# Patient Record
Sex: FEMALE | Race: WHITE | URBAN - METROPOLITAN AREA
[De-identification: names, ages, dates, MRNs, and addresses within clinical notes are randomized per-mention and may not be internally consistent; named-entity substitution may affect disease eponyms.]

---

## 2020-03-13 ENCOUNTER — EMERGENCY (EMERGENCY)
Facility: HOSPITAL | Age: 35
LOS: 1 days | Discharge: HOME | End: 2020-03-13
Attending: EMERGENCY MEDICINE | Admitting: EMERGENCY MEDICINE
Payer: SELF-PAY

## 2020-03-13 VITALS
HEART RATE: 68 BPM | OXYGEN SATURATION: 100 % | SYSTOLIC BLOOD PRESSURE: 109 MMHG | DIASTOLIC BLOOD PRESSURE: 73 MMHG | RESPIRATION RATE: 18 BRPM | TEMPERATURE: 98 F

## 2020-03-13 VITALS
TEMPERATURE: 99 F | WEIGHT: 104.94 LBS | RESPIRATION RATE: 16 BRPM | HEART RATE: 75 BPM | DIASTOLIC BLOOD PRESSURE: 85 MMHG | SYSTOLIC BLOOD PRESSURE: 128 MMHG | OXYGEN SATURATION: 98 %

## 2020-03-13 DIAGNOSIS — R06.02 SHORTNESS OF BREATH: ICD-10-CM

## 2020-03-13 DIAGNOSIS — J20.9 ACUTE BRONCHITIS, UNSPECIFIED: ICD-10-CM

## 2020-03-13 PROCEDURE — 99284 EMERGENCY DEPT VISIT MOD MDM: CPT

## 2020-03-13 PROCEDURE — 93010 ELECTROCARDIOGRAM REPORT: CPT

## 2020-03-13 RX ORDER — ALBUTEROL 90 UG/1
2 AEROSOL, METERED ORAL
Qty: 1 | Refills: 0
Start: 2020-03-13 | End: 2020-03-22

## 2020-03-13 RX ORDER — AZITHROMYCIN 500 MG/1
1 TABLET, FILM COATED ORAL
Qty: 3 | Refills: 0
Start: 2020-03-13 | End: 2020-03-15

## 2020-03-13 RX ORDER — IPRATROPIUM/ALBUTEROL SULFATE 18-103MCG
3 AEROSOL WITH ADAPTER (GRAM) INHALATION
Refills: 0 | Status: DISCONTINUED | OUTPATIENT
Start: 2020-03-13 | End: 2020-03-17

## 2020-03-13 RX ADMIN — Medication 3 MILLILITER(S): at 17:23

## 2020-03-13 RX ADMIN — Medication 60 MILLIGRAM(S): at 17:23

## 2020-03-13 NOTE — ED PROVIDER NOTE - PATIENT PORTAL LINK FT
You can access the FollowMyHealth Patient Portal offered by [facility] by registering at the following website: [email or web address]. By joining [facility]’s FollowMyHealth portal, you will also be able to view your health information using other applications (apps) compatible with our system.

## 2020-03-13 NOTE — ED PROVIDER NOTE - HISTORY ATTESTATION, MLM
I have reviewed and confirmed nurses' notes... coagulation(Bleeding disorder R/T clinical cond/anti-coags)/diagnosis/other

## 2020-03-13 NOTE — ED PROVIDER NOTE - CLINICAL SUMMARY MEDICAL DECISION MAKING FREE TEXT BOX
33 y/o F presenting with wheezing and SOB. Pt also reports having recent sore throat, subjective fevers, night sweats, and productive coughs within the last week. On exam, Pt appears well and in no apparent distress. Suspect symptoms are more likely related to bronchitis. Will check EKG and provide symptomatic relief with Duoneb and Prednisone. Anticipate D/C home afterwards with prescription medications.

## 2020-03-13 NOTE — ED ADULT NURSE NOTE - CHPI ED NUR SYMPTOMS NEG
no tingling/no decreased eating/drinking/no nausea/no pain/no chills/no vomiting/no weakness/no dizziness/no fever

## 2020-03-13 NOTE — ED ADULT NURSE NOTE - CAS EDP DISCH TYPE
HYPOGLYCEMIC EPISODE DOCUMENTATION Patient with hypoglycemic episode(s) at 0303(time) on 9/8/18(date). BG value(s) pre-treatment 54 Was patient symptomatic? [] yes, [x] no Patient was treated with the following rescue medications/treatments: [] D50 [] Glucose tablets 
              [] Glucagon 
              [x] 4oz juice 
              [] 6oz reg soda 
              [] 8oz low fat milk BG value post-treatment: 96 Once BG treated and value greater than 80mg/dl, pt was provided with the following: 
[] snack 
[] meal 
Name of MD notified:[facility] The following orders were received: do POC check and treat accordingly Home

## 2020-03-13 NOTE — ED PROVIDER NOTE - PROGRESS NOTE DETAILS
Pt reports her wheezing has gone down "quite a bit" following Nebulizer Tx. pt with significantly improved res sx, lungs CTA no wheezing, ambulatory without return of wheezing, stable for dc home

## 2020-03-13 NOTE — ED PROVIDER NOTE - ADDITIONAL NOTES AND INSTRUCTIONS:
PLEASE EXCUSE THIS PATIENT FROM WORK/SCHOOL AS NOTED FOR THE DATES ABOVE.  THIS PATIENT WAS SEEN AND EVALUATED IN THE EMERGENCY DEPARTMENT OF [facility].  tHIS PATIENT REQUIRES TIME OFF WORK/SCHOOL TO RECOVER FROM AN EMERGENCY MEDICAL CONDITION.  PLEASE EXCUSE THEIR ABSENCE.

## 2020-03-13 NOTE — ED ADULT NURSE NOTE - OBJECTIVE STATEMENT
35 yo F c/o SOB. Pt states she was at an international conference and returned on Monday with a sore throat, shortness of breath, and cough. Pt states "my chest feels tight and it feels like I am having trouble getting air in." Pt speaking in full complete sentences, lung sounds clear bilaterally, no signs of dyspnea and no cyanosis noted. Pt states "I coughed up some yellowish green stuff." Denies fever/chills, N/V/D, CP, headache, dizziness, numbness/tingling. Pt ambulatory with steady gait.

## 2020-03-13 NOTE — ED PROVIDER NOTE - NSFOLLOWUPINSTRUCTIONS_ED_ALL_ED_FT
What is bronchitis?  Bronchitis is an infection that causes a cough. It happens when the tubes that carry air into the lungs, called the "bronchi," get infected (figure 1).    Usually, bronchitis happens after a person gets a cold or the flu. The viruses that cause the cold or flu infect the bronchi and irritate them. People often wonder if taking antibiotics will help with their bronchitis. But the answer is no, because it is usually caused by a virus. Antibiotics kill bacteria, not viruses.    Bronchitis can also happen when a person gets an infection called "whooping cough," but this is much less common. Whooping cough is caused by bacteria that can infect the bronchi. Most people get vaccines that prevent whooping cough, but the vaccine doesn't always work. Your doctor will be able to tell if you have whooping cough by doing an exam and listening to way your cough sounds.    This article is about "acute" bronchitis. This is different from "chronic" bronchitis, which is an illness in smokers who have a long-lasting cough.      What are the symptoms of bronchitis?  The most common symptoms of bronchitis are:    ?A nagging cough that can last up to a few weeks      ?Coughing up mucus that is clear, yellow, or green – Some people think green mucus means you have a bacterial infection. But this is not always true.      ?You might also have normal cold or flu symptoms, like a stuffy nose, sore throat, or headache. People with bronchitis do not usually get a fever.        When should I call the doctor or nurse?  Most people who have a cough that lasts longer than their other cold or flu symptoms do not need to see a doctor. The cough can take up to 3 weeks to get better, sometimes even longer. But you should call your doctor or nurse if you have:    ?A fever higher than 100.4°F (38°C)    ?Chest pain when you cough, trouble breathing, or coughing up blood    ?A barking cough that makes it hard to talk    ?A cough and weight loss that you cannot explain    ?Symptoms that are not getting better after 3 weeks         Is there a test for bronchitis?  People do not usually need a test. But your doctor or nurse might do a test, such as a chest X-ray, if the cause of your cough isn't clear.      How is bronchitis treated?  Bronchitis almost always goes away on its own, although it can take a few weeks. Doctors do not usually treat bronchitis with antibiotic medicines. That's because bronchitis is usually caused by a virus, and antibiotics kill bacteria, not viruses. Antibiotics will not help your bronchitis go away faster, and they can actually cause other problems. So it's not a good idea to take them if you don't really need them.    To feel better, you can treat your cold and flu symptoms. Different treatments you can try include:    ?Getting lots of rest and drinking plenty of liquids    ?Drinking hot tea    ?Sucking on cough drops or hard candy    ?Taking over-the-counter cough and cold medicines    ?Breathing in warm, moist air, such as in the shower, over a kettle, or from a humidifier    ?Taking a pain-relieving medicine if you have cold or flu symptoms like headache, muscle aches, or joint pain      It's also important to avoid smoking or being around others who smoke. This can make your cough worse.      How can I keep from getting bronchitis again?  You can reduce your chance of getting bronchitis again by keeping the germs that cause bronchitis out of your body. One of the best ways to do this is to wash your hands often with soap and water. If there is no sink nearby, you can use a hand gel with alcohol in it to clean your hands.      How can I keep from spreading my germs?  In addition to washing your hands often, you should cover your mouth with your elbow when you sneeze or cough. Using your elbow keeps you from getting germs on your hands. If you use a tissue, throw the tissue away and wash your hands.

## 2020-07-21 NOTE — ED PROVIDER NOTE - PMH
Chief Complaint   Patient presents with    Follow-up     6 month follow up     Health Maintenance   Topic Date Due    Hepatitis C Screening  1953    DTaP,Tdap,and Td Vaccines (1 - Tdap) 09/25/1965    HIV Screening  09/25/1969    MAMMOGRAM  08/07/2019    Pneumococcal Vaccine: 65+ Years (1 of 2 - PCV13) 09/25/2019    Influenza Vaccine  07/01/2020    BMI: Followup Plan  01/20/2021    Fall Risk  01/20/2021    Annual Physical  02/11/2021    BMI: Adult  07/21/2021    CRC Screening: Colonoscopy  03/30/2026    Pneumococcal Vaccine: Pediatrics (0 to 5 Years) and At-Risk Patients (6 to 59 Years)  Aged Out    HIB Vaccine  Aged Out    Hepatitis B Vaccine  Aged Out    IPV Vaccine  Aged Out    Hepatitis A Vaccine  Aged Out    Meningococcal ACWY Vaccine  Aged Out    HPV Vaccine  Aged Out     Assessment/Plan:    HTN (hypertension)  BP is stable  Continue Losartan 100 mg daily, Metoprolol ER 25 mg twice daily  Mixed hyperlipidemia  Continue Atorvastatin 40 mg daily  Check  lipid panel  Vitamin D deficiency  Patient has not been taking Vit D 50,000 IU weekly on a regular basis  Will check Vit D 25 hydroxy and instruct patient regarding dose she needs to take  Hypothyroidism  Continue replacement with Levothyroxine  Check TSH level  Depression with anxiety  Mood has been stable  Continue Citalopram 40 mg daily, Lamictal 200 mg daily, Klonopin 2 mg at bedtime  Left hip pain  Will order x-ray of the left hip to rule out osteoarthritis  Patient c/o some burning sensation over anterior and lateral thigh, r/o   meralgia paresthetica  Consider starting on Gabapentin, referral to orthopedic surgeon  Recommended to try OTC Lidocaine patches  HM: recommended to schedule screening mammogram as ordered in 1/2020  Colonoscopy done in 3/2016  Patient had episode of acute diverticulitis in June 2020  Recommended to schedule follow-up visit with colorectal surgeon Dr Leanne Shannon  Schedule follow-up office visit in 6 months  Diagnoses and all orders for this visit:    Essential hypertension    Mixed hyperlipidemia  -     Lipid panel; Future  -     Lipid panel    Vitamin D deficiency  -     Vitamin D 25 hydroxy; Future  -     Vitamin D 25 hydroxy  -     ergocalciferol (VITAMIN D2) 50,000 units; Take 1 capsule (50,000 Units total) by mouth once a week    Acquired hypothyroidism  -     TSH, 3rd generation with Free T4 reflex; Future  -     TSH, 3rd generation with Free T4 reflex    Depression with anxiety    Need for hepatitis C screening test  -     Hepatitis C antibody; Future    Left hip pain  -     XR hip/pelv 2-3 vws left if performed; Future          Subjective:      Patient ID: Go Rashid is a 73 y o  female  HPI     Patient presents for 6 month follow-up visit  PMHx: HTN, Hyperlipidemia, Obesity,  Hypothyroidism, Depression, Anxiety, Vit D deficiency  Reviewed current medications with patient  Patient was seen in [facility] emergency room in June 2020 for acute diverticulitis  She was treated with Augmentin  Denies abdominal pain, diarrhea, constipation  She had colonoscopy done in March 2016 by colorectal surgeon Dr Ayan Brsyon  Colonoscopy showed severe diverticulosis, 1 polyp was removed  HTN - BP emains stable  Patient takes Losartan 100 mg daily, Metoprolol ER 25 mg twice daily  Denies chest pain, shortness of breath, dizziness  Blood test done in June 2020 showed creatinine 1 08, potassium 3 7  Hypothyroidism- currently taking Levothyroxine 75 mcg on Friday, Saturday and Sunday in 50 mcg all other days  Hyperlipidemia - on Atorvastatin 40 mg daily  Vit D deficiency - patient states that she has not been taking Vit D 50,000 IU 1 capsule once a week as prescribed on regular basis  Depression/ Anxiety -mood has been stable  Patient takes Citalopram 40 mg daily, Lamictal 200 mg daily, Klonopin 2 mg at bedtime  C/o left hip pain radiating to left thigh, some burning sensation in left thigh area for the last few months  Denies low back pain  The following portions of the patient's history were reviewed and updated as appropriate: allergies, current medications, past medical history, past social history, past surgical history and problem list     Review of Systems   Constitutional: Positive for fatigue (mild)  Negative for activity change, appetite change, chills and fever  HENT: Negative for congestion, ear pain, hearing loss, mouth sores, nosebleeds, sore throat, tinnitus and trouble swallowing  Eyes: Negative for pain, discharge, redness, itching and visual disturbance  Respiratory: Negative for cough, chest tightness, shortness of breath and wheezing  Cardiovascular: Negative for chest pain, palpitations and leg swelling  Gastrointestinal: Negative for abdominal pain, blood in stool, constipation, diarrhea, nausea and vomiting  Genitourinary: Negative for difficulty urinating, dysuria, flank pain, frequency, hematuria and pelvic pain  Musculoskeletal: Positive for arthralgias (left hip pain )  Negative for back pain, gait problem, joint swelling and neck pain  Skin: Negative for rash and wound  Neurological: Negative for dizziness and headaches  Hematological: Negative  Psychiatric/Behavioral:        Anxiety / Depression - mood has been stable         Objective:      /82 (BP Location: Left arm, Patient Position: Sitting, Cuff Size: Adult)   Pulse 68   Temp 97 9 °F (36 6 °C) (Oral)   Resp 16   Ht 5' 6 5" (1 689 m)   Wt 85 3 kg (188 lb)   SpO2 97%   BMI 29 89 kg/m²     /84  Physical Exam   Constitutional: She appears well-developed and well-nourished  HENT:   Head: Normocephalic and atraumatic  Right Ear: External ear normal    Left Ear: External ear normal    Eyes: Pupils are equal, round, and reactive to light   Conjunctivae are normal    Cardiovascular: Normal rate, regular rhythm and normal heart sounds  No murmur heard  No carotid bruits BL  No BL LE edema   Pulmonary/Chest: Effort normal and breath sounds normal    Abdominal: Soft  Bowel sounds are normal  There is no tenderness  Musculoskeletal:   Left hip: FROM  Mild tenderness over trochanteric bursa  Skin: Skin is warm and dry  No rash noted  Psychiatric: She has a normal mood and affect  Nursing note and vitals reviewed  No pertinent past medical history <<----- Click to add NO pertinent Past Medical History

## 2022-02-07 NOTE — ED PROVIDER NOTE - CCCP TRG CHIEF CMPLNT
shortness of breath Colchicine Counseling:  Patient counseled regarding adverse effects including but not limited to stomach upset (nausea, vomiting, stomach pain, or diarrhea).  Patient instructed to limit alcohol consumption while taking this medication.  Colchicine may reduce blood counts especially with prolonged use.  The patient understands that monitoring of kidney function and blood counts may be required, especially at baseline. The patient verbalized understanding of the proper use and possible adverse effects of colchicine.  All of the patient's questions and concerns were addressed.

## 2022-10-24 NOTE — ED PROVIDER NOTE - MDM ORDERS SUBMITTED SELECTION
[address]: [id number] EKG/Medications